# Patient Record
Sex: FEMALE | Race: WHITE | Employment: OTHER | URBAN - METROPOLITAN AREA
[De-identification: names, ages, dates, MRNs, and addresses within clinical notes are randomized per-mention and may not be internally consistent; named-entity substitution may affect disease eponyms.]

---

## 2017-04-24 ENCOUNTER — OFFICE VISIT (OUTPATIENT)
Dept: UROLOGY | Age: 62
End: 2017-04-24
Payer: COMMERCIAL

## 2017-04-24 ENCOUNTER — HOSPITAL ENCOUNTER (OUTPATIENT)
Age: 62
Setting detail: SPECIMEN
Discharge: HOME | End: 2017-04-24
Payer: COMMERCIAL

## 2017-04-24 VITALS
TEMPERATURE: 98.1 F | HEIGHT: 65 IN | WEIGHT: 108 LBS | DIASTOLIC BLOOD PRESSURE: 62 MMHG | SYSTOLIC BLOOD PRESSURE: 102 MMHG | BODY MASS INDEX: 17.99 KG/M2

## 2017-04-24 DIAGNOSIS — R30.0 DYSURIA: ICD-10-CM

## 2017-04-24 DIAGNOSIS — R39.15 URGENCY OF URINATION: Primary | ICD-10-CM

## 2017-04-24 DIAGNOSIS — R10.9 BILATERAL FLANK PAIN: ICD-10-CM

## 2017-04-24 DIAGNOSIS — R35.0 FREQUENCY OF URINATION: ICD-10-CM

## 2017-04-24 DIAGNOSIS — R39.9 LOWER URINARY TRACT SYMPTOMS (LUTS): ICD-10-CM

## 2017-04-24 DIAGNOSIS — R39.15 URGENCY OF URINATION: ICD-10-CM

## 2017-04-24 LAB
-: ABNORMAL
AMORPHOUS: ABNORMAL
BACTERIA: ABNORMAL
BILIRUBIN URINE: NEGATIVE
CASTS UA: ABNORMAL /LPF
COLOR: YELLOW
COMMENT UA: ABNORMAL
CRYSTALS, UA: ABNORMAL /HPF
EPITHELIAL CELLS UA: ABNORMAL /HPF (ref 0–25)
GLUCOSE URINE: NEGATIVE
KETONES, URINE: NEGATIVE
LEUKOCYTE ESTERASE, URINE: NEGATIVE
MUCUS: ABNORMAL
NITRITE, URINE: NEGATIVE
OTHER OBSERVATIONS UA: ABNORMAL
PH UA: 5.5 (ref 5–9)
PROTEIN UA: NEGATIVE
RBC UA: ABNORMAL /HPF (ref 0–2)
RENAL EPITHELIAL, UA: ABNORMAL /HPF
SPECIFIC GRAVITY UA: 1.01 (ref 1.01–1.02)
TRICHOMONAS: ABNORMAL
TURBIDITY: CLEAR
URINE HGB: ABNORMAL
UROBILINOGEN, URINE: NORMAL
WBC UA: ABNORMAL /HPF (ref 0–5)
YEAST: ABNORMAL

## 2017-04-24 PROCEDURE — 81001 URINALYSIS AUTO W/SCOPE: CPT

## 2017-04-24 PROCEDURE — 1036F TOBACCO NON-USER: CPT | Performed by: UROLOGY

## 2017-04-24 PROCEDURE — 99214 OFFICE O/P EST MOD 30 MIN: CPT | Performed by: UROLOGY

## 2017-04-24 PROCEDURE — G8419 CALC BMI OUT NRM PARAM NOF/U: HCPCS | Performed by: UROLOGY

## 2017-04-24 PROCEDURE — 3017F COLORECTAL CA SCREEN DOC REV: CPT | Performed by: UROLOGY

## 2017-04-24 PROCEDURE — 51798 US URINE CAPACITY MEASURE: CPT | Performed by: UROLOGY

## 2017-04-24 PROCEDURE — 87086 URINE CULTURE/COLONY COUNT: CPT

## 2017-04-24 PROCEDURE — 3014F SCREEN MAMMO DOC REV: CPT | Performed by: UROLOGY

## 2017-04-24 PROCEDURE — G8427 DOCREV CUR MEDS BY ELIG CLIN: HCPCS | Performed by: UROLOGY

## 2017-04-24 RX ORDER — ESTRADIOL 10 UG/1
INSERT VAGINAL
Qty: 12 TABLET | Refills: 5 | Status: SHIPPED | OUTPATIENT
Start: 2017-04-24 | End: 2017-05-09 | Stop reason: SDUPTHER

## 2017-04-24 RX ORDER — CIPROFLOXACIN 500 MG/1
500 TABLET, FILM COATED ORAL 2 TIMES DAILY
Qty: 14 TABLET | Refills: 0 | Status: SHIPPED | OUTPATIENT
Start: 2017-04-24 | End: 2017-05-01

## 2017-04-24 ASSESSMENT — ENCOUNTER SYMPTOMS
NAUSEA: 0
VOMITING: 0
ABDOMINAL PAIN: 0
COUGH: 0
BACK PAIN: 0
SHORTNESS OF BREATH: 0
COLOR CHANGE: 0
WHEEZING: 0
EYE REDNESS: 0
EYE PAIN: 0

## 2017-04-25 LAB
CULTURE: NO GROWTH
CULTURE: NORMAL
Lab: NORMAL
Lab: NORMAL
SPECIMEN DESCRIPTION: NORMAL
SPECIMEN DESCRIPTION: NORMAL
STATUS: NORMAL

## 2017-05-08 ENCOUNTER — TELEPHONE (OUTPATIENT)
Dept: UROLOGY | Age: 62
End: 2017-05-08

## 2017-05-09 RX ORDER — ESTRADIOL 10 UG/1
INSERT VAGINAL
Qty: 12 TABLET | Refills: 5 | Status: SHIPPED | OUTPATIENT
Start: 2017-05-09

## 2017-05-24 ENCOUNTER — OFFICE VISIT (OUTPATIENT)
Dept: SURGERY | Age: 62
End: 2017-05-24
Payer: COMMERCIAL

## 2017-05-24 VITALS
TEMPERATURE: 97.3 F | RESPIRATION RATE: 16 BRPM | HEART RATE: 81 BPM | BODY MASS INDEX: 18.26 KG/M2 | DIASTOLIC BLOOD PRESSURE: 62 MMHG | WEIGHT: 109.6 LBS | HEIGHT: 65 IN | SYSTOLIC BLOOD PRESSURE: 96 MMHG

## 2017-05-24 DIAGNOSIS — Z12.11 SCREENING FOR COLON CANCER: Primary | ICD-10-CM

## 2017-05-24 PROCEDURE — 3017F COLORECTAL CA SCREEN DOC REV: CPT | Performed by: SURGERY

## 2017-05-24 PROCEDURE — 1036F TOBACCO NON-USER: CPT | Performed by: SURGERY

## 2017-05-24 PROCEDURE — G8419 CALC BMI OUT NRM PARAM NOF/U: HCPCS | Performed by: SURGERY

## 2017-05-24 PROCEDURE — G8427 DOCREV CUR MEDS BY ELIG CLIN: HCPCS | Performed by: SURGERY

## 2017-05-24 PROCEDURE — 99204 OFFICE O/P NEW MOD 45 MIN: CPT | Performed by: SURGERY

## 2017-05-24 PROCEDURE — 3014F SCREEN MAMMO DOC REV: CPT | Performed by: SURGERY

## 2017-06-22 ENCOUNTER — HOSPITAL ENCOUNTER (OUTPATIENT)
Age: 62
Setting detail: OUTPATIENT SURGERY
Discharge: HOME | End: 2017-06-22
Attending: SURGERY | Admitting: SURGERY
Payer: COMMERCIAL

## 2017-06-22 ENCOUNTER — ANESTHESIA EVENT (OUTPATIENT)
Dept: OPERATING ROOM | Age: 62
End: 2017-06-22
Payer: COMMERCIAL

## 2017-06-22 ENCOUNTER — ANESTHESIA (OUTPATIENT)
Dept: OPERATING ROOM | Age: 62
End: 2017-06-22
Payer: COMMERCIAL

## 2017-06-22 VITALS
WEIGHT: 108 LBS | SYSTOLIC BLOOD PRESSURE: 109 MMHG | HEIGHT: 65 IN | HEART RATE: 72 BPM | RESPIRATION RATE: 18 BRPM | TEMPERATURE: 97.8 F | OXYGEN SATURATION: 99 % | DIASTOLIC BLOOD PRESSURE: 49 MMHG | BODY MASS INDEX: 17.99 KG/M2

## 2017-06-22 VITALS
DIASTOLIC BLOOD PRESSURE: 53 MMHG | OXYGEN SATURATION: 99 % | SYSTOLIC BLOOD PRESSURE: 99 MMHG | RESPIRATION RATE: 29 BRPM

## 2017-06-22 PROCEDURE — 45378 DIAGNOSTIC COLONOSCOPY: CPT | Performed by: SURGERY

## 2017-06-22 PROCEDURE — 2500000003 HC RX 250 WO HCPCS: Performed by: NURSE ANESTHETIST, CERTIFIED REGISTERED

## 2017-06-22 PROCEDURE — 7100000010 HC PHASE II RECOVERY - FIRST 15 MIN: Performed by: SURGERY

## 2017-06-22 PROCEDURE — 3609027000 HC COLONOSCOPY: Performed by: SURGERY

## 2017-06-22 PROCEDURE — 2580000003 HC RX 258: Performed by: NURSE ANESTHETIST, CERTIFIED REGISTERED

## 2017-06-22 PROCEDURE — 3700000001 HC ADD 15 MINUTES (ANESTHESIA): Performed by: SURGERY

## 2017-06-22 PROCEDURE — 3700000000 HC ANESTHESIA ATTENDED CARE: Performed by: SURGERY

## 2017-06-22 PROCEDURE — 6360000002 HC RX W HCPCS: Performed by: NURSE ANESTHETIST, CERTIFIED REGISTERED

## 2017-06-22 PROCEDURE — 7100000011 HC PHASE II RECOVERY - ADDTL 15 MIN: Performed by: SURGERY

## 2017-06-22 RX ORDER — PROPOFOL 10 MG/ML
INJECTION, EMULSION INTRAVENOUS CONTINUOUS PRN
Status: DISCONTINUED | OUTPATIENT
Start: 2017-06-22 | End: 2017-06-22 | Stop reason: SDUPTHER

## 2017-06-22 RX ORDER — SODIUM CHLORIDE, SODIUM LACTATE, POTASSIUM CHLORIDE, CALCIUM CHLORIDE 600; 310; 30; 20 MG/100ML; MG/100ML; MG/100ML; MG/100ML
INJECTION, SOLUTION INTRAVENOUS CONTINUOUS PRN
Status: DISCONTINUED | OUTPATIENT
Start: 2017-06-22 | End: 2017-06-22 | Stop reason: SDUPTHER

## 2017-06-22 RX ORDER — PROPOFOL 10 MG/ML
INJECTION, EMULSION INTRAVENOUS PRN
Status: DISCONTINUED | OUTPATIENT
Start: 2017-06-22 | End: 2017-06-22 | Stop reason: SDUPTHER

## 2017-06-22 RX ORDER — LIDOCAINE HYDROCHLORIDE 20 MG/ML
INJECTION, SOLUTION INFILTRATION; PERINEURAL PRN
Status: DISCONTINUED | OUTPATIENT
Start: 2017-06-22 | End: 2017-06-22 | Stop reason: SDUPTHER

## 2017-06-22 RX ADMIN — SODIUM CHLORIDE, POTASSIUM CHLORIDE, SODIUM LACTATE AND CALCIUM CHLORIDE: 600; 310; 30; 20 INJECTION, SOLUTION INTRAVENOUS at 11:30

## 2017-06-22 RX ADMIN — PROPOFOL 30 MG: 10 INJECTION, EMULSION INTRAVENOUS at 11:45

## 2017-06-22 RX ADMIN — PROPOFOL 120 MCG/KG/MIN: 10 INJECTION, EMULSION INTRAVENOUS at 11:43

## 2017-06-22 RX ADMIN — LIDOCAINE HYDROCHLORIDE 60 MG: 20 INJECTION, SOLUTION INFILTRATION; PERINEURAL at 11:44

## 2017-06-22 RX ADMIN — PROPOFOL 30 MG: 10 INJECTION, EMULSION INTRAVENOUS at 11:40

## 2017-06-22 RX ADMIN — PROPOFOL 50 MG: 10 INJECTION, EMULSION INTRAVENOUS at 11:42

## 2017-06-22 ASSESSMENT — PAIN SCALES - GENERAL
PAINLEVEL_OUTOF10: 0
PAINLEVEL_OUTOF10: 0

## 2017-11-03 ENCOUNTER — HOSPITAL ENCOUNTER (OUTPATIENT)
Dept: PHYSICAL THERAPY | Age: 62
Setting detail: THERAPIES SERIES
Discharge: HOME | End: 2017-11-03
Payer: COMMERCIAL

## 2017-11-03 PROCEDURE — G8982 BODY POS GOAL STATUS: HCPCS

## 2017-11-03 PROCEDURE — 97161 PT EVAL LOW COMPLEX 20 MIN: CPT

## 2017-11-03 PROCEDURE — 97112 NEUROMUSCULAR REEDUCATION: CPT

## 2017-11-03 PROCEDURE — G8981 BODY POS CURRENT STATUS: HCPCS

## 2017-11-03 NOTE — PROGRESS NOTES
Phone: [address] [facility]          Fax: [phone]                      Outpatient Physical Therapy                                                                      Evaluation  Date: 11/3/2017  Patient: Scotty Lin  : 1955  [facility] #: [id number]  Referring Practitioner: Dr. Torres Razo    Referral Date : 17     Diagnosis: BPPV (H81.12)    Treatment Diagnosis: vertigo  Onset Date: 10/30/17  PT Insurance Information: [employer]  Total # of Visits Approved: 6   Total # of Visits to Date: 1  No Show: 0  Canceled Appointment: 0     Subjective  Subjective: Pt arrives to PT evaluation with complaints of vertigo which started on Monday when she woke up. Pt states she sat up and felt like the room was spinning. Pt states she laid back down for a few minutes and then returned to seated position and symptoms were not as bad, but still present for the next 45mins. Pt states same thing has happened every morning since then. Pt states she has been up several times throughout the night with her grandchild which is a . However, pt states when she lays flat and sleeps for more than 2-3 hours, then symptoms present when she gets up. Pt states as the day goes on dizziness gets better. Pt states she saw doctor on Wednesday and was given script for antivert and was instructed to perform Epley maneuver, which pt states she has been unable to perform correctly on her own, even after looking up proper maneuver techniques. Pt states she took antivert last night and states she slept for 12 hours. Objective        Spine  Cervical: WNL          Additional Measures  Special Tests: (-) vertebral artery  Other: (-) Anika-hallpike x2 bilaterally       Assessment  Assessment: Pt is a 57 y/o female who presents to PT with vertigo. Performed [place]-hallpike x2 which was negative bilaterally. However, pt took Antivert within the past 24 hours, which could effect results of testing.  Pt was instructed to schedule reassessment next week and was instructed to avoid taking Antivert for 24hrs prior to appt to get true results with testing. Pt will beneift from skilled PT to address the above impairments and to work toward the established functional goals. Prognosis: Good        Decision Making: Low Complexity    Patient Education  Purpose of samy-hallpike and plan for reassessment next week without Antivert taken within 24hrs. Pt verbalized/demonstrated good understanding:     [x] Yes         [] No, pt required further clarification. [x] Primary Impairment :   G Code:    [] Mobility         [] Carry        [x] Body Position       [] Self Care      [] Other:   Functional Impairment Current:  [] 0%    [] 1-19% [x] 20-39% [] 40-59% [] 60-79%    [] 80-99% [] 100%  Functional Impairment Goal:  [x] 0%    [] 1-19% [] 20-39% [] 40-59% [] 60-79%    [] 80-99% [] 100%  G Code Functional Impairment determined by:  [x] Clinical Judgment   [] Outcome Measure:     Goals  Short term goals  Time Frame for Short term goals: 3 weeks  Short term goal 1: Pt will be reassessed for vertigo with samy-hallpike maneuver and treated with Epley maneuver if needed. Short term goal 2: Pt will report 25% improvement with dizziness for improved quality of life. Long term goals  Time Frame for Long term goals : 6 weeks  Long term goal 1: Pt will demo (-) samy-hallpike to decrease symptoms of dizziness. Long term goal 2: Pt will report 75% improvement in dizziness and overall function for ease of getting in/out of bed.        Patient goals : Decrease dizziness         Minutes Tracking:  Time In: 6945  Time Out: 1600  Minutes: 845 [address], DPD[place]       11/3/2017

## 2017-11-03 NOTE — PLAN OF CARE
[facility]           Phone: [phone]             Outpatient Physical Therapy  Fax: [phone]                                           Date: 11/3/2017  Patient: Mo Gan : 1955 [facility] #: [id number]   Referring Practitioner:  Dr. Amber Stone Referral Date:  17       [x] Plan of Care   [] Updated Plan of Care    Dates of Service to Include: 11/3/2017 to 12/15/17    Diagnosis:  BPPV (H81.12)    Rehab (Treatment) Diagnosis:  vertigo             Onset Date:  10/30/17    Attendance  Total # of Visits to Date: 1 No Show: 0 Canceled Appointment: 0    Assessment  Assessment: Pt is a 59 y/o female who presents to PT with vertigo. Performed [place]-hallpike x2 which was negative bilaterally. However, pt took Antivert within the past 24 hours, which could effect results of testing. Pt was instructed to schedule reassessment next week and was instructed to avoid taking Antivert for 24hrs prior to appt to get true results with testing. Pt will beneift from skilled PT to address the above impairments and to work toward the established functional goals. [x] Primary Impairment :   G Code:    [] Mobility         [] Carry        [x] Body Position       [] Self Care      [] Other:   Functional Impairment Current:  [] 0%    [] 1-19% [x] 20-39% [] 40-59% [] 60-79%    [] 80-99% [] 100%  Functional Impairment Goal:  [x] 0%    [] 1-19% [] 20-39% [] 40-59% [] 60-79%    [] 80-99% [] 100%  G Code Functional Impairment determined by:  [x] Clinical Judgment   [] Outcome Measure:     Goals  Short term goals  Time Frame for Short term goals: 3 weeks  Short term goal 1: Pt will be reassessed for vertigo with samy-hallpike maneuver and treated with Epley maneuver if needed. Short term goal 2: Pt will report 25% improvement with dizziness for improved quality of life.   Long term goals  Time Frame for Long term goals : 6 weeks  Long term

## 2018-08-10 ENCOUNTER — OFFICE VISIT (OUTPATIENT)
Dept: UROLOGY | Age: 63
End: 2018-08-10
Payer: COMMERCIAL

## 2018-08-10 VITALS
HEIGHT: 65 IN | SYSTOLIC BLOOD PRESSURE: 118 MMHG | WEIGHT: 109 LBS | BODY MASS INDEX: 18.16 KG/M2 | DIASTOLIC BLOOD PRESSURE: 80 MMHG

## 2018-08-10 DIAGNOSIS — N32.81 OAB (OVERACTIVE BLADDER): ICD-10-CM

## 2018-08-10 DIAGNOSIS — R31.29 MICROSCOPIC HEMATURIA: Primary | ICD-10-CM

## 2018-08-10 PROCEDURE — 3017F COLORECTAL CA SCREEN DOC REV: CPT | Performed by: NURSE PRACTITIONER

## 2018-08-10 PROCEDURE — G8427 DOCREV CUR MEDS BY ELIG CLIN: HCPCS | Performed by: NURSE PRACTITIONER

## 2018-08-10 PROCEDURE — G8419 CALC BMI OUT NRM PARAM NOF/U: HCPCS | Performed by: NURSE PRACTITIONER

## 2018-08-10 PROCEDURE — 99213 OFFICE O/P EST LOW 20 MIN: CPT | Performed by: NURSE PRACTITIONER

## 2018-08-10 PROCEDURE — 1036F TOBACCO NON-USER: CPT | Performed by: NURSE PRACTITIONER

## 2018-08-10 NOTE — PROGRESS NOTES
Subjective:      Patient ID: Dannielle Da Silva is a 61 y.o. female. HPI  Patient is a 61 y.o. female in no acute distress. She is alert and oriented to person, place, and time. History  Referred for LUTS and bilateral flank pain. Treated with several courses of abx (not always positive culture) and Ditropan XL with improvement. 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 Microscopic hematuria: She did see Dr. Jignesh Muniz in the past, normal cystoscopy. 7/2016 normal. CT and Cysto normal. Pelvic showed vaginal atrophy, no cystocele, no rectocele, no levator ani tenderness, no reproducible stress urinary incontinence    She saw Dr. Gonzalez Haas once in the past and was diagnosed with IC (at first appt, no cysto) but stopped the medication and did not continue follow-up with him. Today  Here today for annual follow-up for history of microscopic hematuria. She did have a urinalysis completed and 6/2018 that was negative for infection, but did show persistent significant microscopic hematuria. Last hematuria workup was in 7/2016. She has had no episodes of gross hematuria or flank pain. She does continue to take oxybutynin 2.5mg. she denies new or worsening frequency, urgency, nocturia, or incontinence.     Past Medical History:   Diagnosis Date    Low back pain     Other intervertebral disc degeneration, lumbar region     Pure hypercholesterolemia     Unspecified vitamin D deficiency     Urgency of urination      Past Surgical History:   Procedure Laterality Date    KNEE SURGERY Left 1972    cartliage removal     [place] COLON CA SCRN NOT HI [facility] IND N/A 6/22/2017    COLONOSCOPY performed by Tessy Aparicio DO at \[facility]"" [facility]       Family History   Problem Relation Age of Onset    Cancer Father      Current Outpatient Prescriptions on File Prior to Visit   Medication Sig Dispense Refill    Estradiol (VAGIFEM) 10 MCG TABS vaginal tablet Use vaginally three times per week (Patient taking differently: as needed Use vaginally three times per week) 12 tablet 5    oxybutynin (DITROPAN-XL) 5 MG CR tablet Take 2.5 mg by mouth daily Prn      Zinc 15 MG CAPS Take 15 mg by mouth daily      Cholecalciferol (VITAMIN D) 2000 UNITS CAPS capsule Take 2,000 Units by mouth 2 times daily      magnesium oxide (MAG-OX) 400 MG tablet Take 400 mg by mouth daily       No current facility-administered medications on file prior to visit. Outpatient Encounter Prescriptions as of 8/10/2018   Medication Sig Dispense Refill    Estradiol (VAGIFEM) 10 MCG TABS vaginal tablet Use vaginally three times per week (Patient taking differently: as needed Use vaginally three times per week) 12 tablet 5    oxybutynin (DITROPAN-XL) 5 MG CR tablet Take 2.5 mg by mouth daily Prn      Zinc 15 MG CAPS Take 15 mg by mouth daily      Cholecalciferol (VITAMIN D) 2000 UNITS CAPS capsule Take 2,000 Units by mouth 2 times daily      magnesium oxide (MAG-OX) 400 MG tablet Take 400 mg by mouth daily       No facility-administered encounter medications on file as of 8/10/2018. Actonel [risedronate sodium] and Septra [sulfamethoxazole-trimethoprim]  History   Smoking Status    Never Smoker   Smokeless Tobacco    Never Used     History   Alcohol Use No     Comment: occasional wine       Vitals:    08/10/18 1102   BP: 118/80     PHYSICAL EXAM:  Constitutional: Patient resting comfortably, in no acute distress. Neuro: Alert and oriented to person place and time. Cranial nerves grossly intact. Psych: Mood and affect normal.  Skin: Warm, dry  HEENT: normocephalic, atraumatic  Lymphatics: No palpable lymphadenopathy  Lungs: Respiratory effort normal, unlabored  Cardiovascular:  Normal peripheral pulses  Abdomen: Soft, non-tender, non-distended with no organomegaly or palpable masses. : No CVA tenderness. Bladder non-tender and not distended. Pelvic: Deferred    No results found for this visit on 08/10/18.   No results found for:

## 2018-08-14 PROBLEM — R31.29 MICROSCOPIC HEMATURIA: Status: ACTIVE | Noted: 2018-08-14

## 2018-08-14 PROBLEM — N32.81 OAB (OVERACTIVE BLADDER): Status: ACTIVE | Noted: 2018-08-14

## 2018-08-14 ASSESSMENT — ENCOUNTER SYMPTOMS
COLOR CHANGE: 0
WHEEZING: 0
VOMITING: 0
SHORTNESS OF BREATH: 0
COUGH: 0
BACK PAIN: 0
CONSTIPATION: 0
NAUSEA: 0
ABDOMINAL PAIN: 0
EYE REDNESS: 0
EYE PAIN: 0

## 2018-09-17 ENCOUNTER — TELEPHONE (OUTPATIENT)
Dept: UROLOGY | Age: 63
End: 2018-09-17

## 2018-09-17 DIAGNOSIS — R30.0 DYSURIA: Primary | ICD-10-CM

## 2018-09-17 DIAGNOSIS — R39.15 URGENCY OF URINATION: ICD-10-CM

## 2018-09-18 RX ORDER — CIPROFLOXACIN 500 MG/1
500 TABLET, FILM COATED ORAL 2 TIMES DAILY
Qty: 20 TABLET | Refills: 0 | Status: SHIPPED | OUTPATIENT
Start: 2018-09-18 | End: 2018-09-28

## 2018-09-26 ENCOUNTER — TELEPHONE (OUTPATIENT)
Dept: UROLOGY | Age: 63
End: 2018-09-26

## 2018-09-27 NOTE — TELEPHONE ENCOUNTER
Spoke with patient and was advised she took the cipro for 5 days and stopped due it causing stomach GI issues. She says she did not drop urine sample off as instructed. She says she did get some relief from the cipro, enough to be able to work. Patient advised to have UAC/S checked as instructed to evaluate for infection. Patient voiced understanding.

## 2018-10-10 ENCOUNTER — TELEPHONE (OUTPATIENT)
Dept: UROLOGY | Age: 63
End: 2018-10-10

## 2018-10-10 DIAGNOSIS — R30.0 DYSURIA: ICD-10-CM

## 2018-10-10 DIAGNOSIS — R39.15 URGENCY OF URINATION: ICD-10-CM

## 2018-10-10 NOTE — PROGRESS NOTES
Call pt - urine cx reviewed and negative for UTI & for significant microhematuria.  Next time wee need to check her urine before antibiotics

## 2018-10-10 NOTE — TELEPHONE ENCOUNTER
----- Message from DINO Lugo - CNP sent at 10/10/2018  1:37 PM EDT -----  Call pt - urine cx reviewed and negative for UTI & for significant microhematuria.  Next time wee need to check her urine before antibiotics

## 2019-09-09 ENCOUNTER — HOSPITAL ENCOUNTER (OUTPATIENT)
Age: 64
Setting detail: SPECIMEN
Discharge: HOME | End: 2019-09-09
Payer: COMMERCIAL

## 2019-09-09 ENCOUNTER — OFFICE VISIT (OUTPATIENT)
Dept: UROLOGY | Age: 64
End: 2019-09-09
Payer: COMMERCIAL

## 2019-09-09 VITALS
BODY MASS INDEX: 17.83 KG/M2 | WEIGHT: 107 LBS | HEIGHT: 65 IN | DIASTOLIC BLOOD PRESSURE: 70 MMHG | SYSTOLIC BLOOD PRESSURE: 110 MMHG

## 2019-09-09 DIAGNOSIS — R30.0 DYSURIA: Primary | ICD-10-CM

## 2019-09-09 DIAGNOSIS — R31.29 MICROSCOPIC HEMATURIA: ICD-10-CM

## 2019-09-09 DIAGNOSIS — R39.15 URGENCY OF URINATION: ICD-10-CM

## 2019-09-09 DIAGNOSIS — N32.81 OAB (OVERACTIVE BLADDER): ICD-10-CM

## 2019-09-09 DIAGNOSIS — R30.0 DYSURIA: ICD-10-CM

## 2019-09-09 LAB
-: ABNORMAL
AMORPHOUS: ABNORMAL
BACTERIA: ABNORMAL
BILIRUBIN URINE: NEGATIVE
CASTS UA: ABNORMAL /LPF
COLOR: YELLOW
COMMENT UA: ABNORMAL
CRYSTALS, UA: ABNORMAL /HPF
EPITHELIAL CELLS UA: ABNORMAL /HPF (ref 0–25)
GLUCOSE URINE: NEGATIVE
KETONES, URINE: NEGATIVE
LEUKOCYTE ESTERASE, URINE: NEGATIVE
MUCUS: ABNORMAL
NITRITE, URINE: NEGATIVE
OTHER OBSERVATIONS UA: ABNORMAL
PH UA: 5.5 (ref 5–9)
PROTEIN UA: NEGATIVE
RBC UA: ABNORMAL /HPF (ref 0–2)
RENAL EPITHELIAL, UA: ABNORMAL /HPF
SPECIFIC GRAVITY UA: >1.03 (ref 1.01–1.02)
TRICHOMONAS: ABNORMAL
TURBIDITY: CLEAR
URINE HGB: ABNORMAL
UROBILINOGEN, URINE: NORMAL
WBC UA: ABNORMAL /HPF (ref 0–5)
YEAST: ABNORMAL

## 2019-09-09 PROCEDURE — G8419 CALC BMI OUT NRM PARAM NOF/U: HCPCS | Performed by: UROLOGY

## 2019-09-09 PROCEDURE — 87086 URINE CULTURE/COLONY COUNT: CPT

## 2019-09-09 PROCEDURE — 1036F TOBACCO NON-USER: CPT | Performed by: UROLOGY

## 2019-09-09 PROCEDURE — 81001 URINALYSIS AUTO W/SCOPE: CPT

## 2019-09-09 PROCEDURE — 51798 US URINE CAPACITY MEASURE: CPT | Performed by: UROLOGY

## 2019-09-09 PROCEDURE — 3017F COLORECTAL CA SCREEN DOC REV: CPT | Performed by: UROLOGY

## 2019-09-09 PROCEDURE — 99213 OFFICE O/P EST LOW 20 MIN: CPT | Performed by: UROLOGY

## 2019-09-09 PROCEDURE — G8427 DOCREV CUR MEDS BY ELIG CLIN: HCPCS | Performed by: UROLOGY

## 2019-09-09 ASSESSMENT — ENCOUNTER SYMPTOMS
ABDOMINAL PAIN: 0
EYE REDNESS: 0
COUGH: 0
COLOR CHANGE: 0
WHEEZING: 0
NAUSEA: 0
VOMITING: 0
SHORTNESS OF BREATH: 0
EYE PAIN: 0
BACK PAIN: 0

## 2019-09-09 NOTE — PATIENT INSTRUCTIONS
SURVEY:    You may be receiving a survey from [employer] regarding your visit today. Please complete the survey to enable us to provide the highest quality of care to you and your family. If you cannot score us a very good on any question, please call the office to discuss how we could have made your experience a very good one. Thank you.

## 2019-09-10 LAB
CULTURE: NORMAL
Lab: NORMAL
SPECIMEN DESCRIPTION: NORMAL

## 2019-09-11 ENCOUNTER — TELEPHONE (OUTPATIENT)
Dept: UROLOGY | Age: 64
End: 2019-09-11

## 2019-09-11 NOTE — TELEPHONE ENCOUNTER
----- Message from DINO Macias - CNP sent at 9/11/2019  4:15 PM EDT -----  Call pt - urine cx reviewed and negative for UTI & for significant microhematuria

## 2020-07-28 ENCOUNTER — HOSPITAL ENCOUNTER (OUTPATIENT)
Age: 65
Discharge: HOME | End: 2020-07-28
Payer: MEDICARE

## 2020-07-28 LAB
ABSOLUTE EOS #: 0.4 K/UL (ref 0–0.44)
ABSOLUTE IMMATURE GRANULOCYTE: <0.03 K/UL (ref 0–0.3)
ABSOLUTE LYMPH #: 1.54 K/UL (ref 1.1–3.7)
ABSOLUTE MONO #: 0.47 K/UL (ref 0.1–1.2)
ALBUMIN SERPL-MCNC: 4.3 G/DL (ref 3.5–5.2)
ALBUMIN/GLOBULIN RATIO: 1.7 (ref 1–2.5)
ALP BLD-CCNC: 57 U/L (ref 35–104)
ALT SERPL-CCNC: 15 U/L (ref 5–33)
ANION GAP SERPL CALCULATED.3IONS-SCNC: 11 MMOL/L (ref 9–17)
AST SERPL-CCNC: 15 U/L
BASOPHILS # BLD: 1 % (ref 0–2)
BASOPHILS ABSOLUTE: 0.04 K/UL (ref 0–0.2)
BILIRUB SERPL-MCNC: 0.56 MG/DL (ref 0.3–1.2)
BUN BLDV-MCNC: 12 MG/DL (ref 8–23)
BUN/CREAT BLD: 19 (ref 9–20)
CALCIUM SERPL-MCNC: 9.7 MG/DL (ref 8.6–10.4)
CHLORIDE BLD-SCNC: 102 MMOL/L (ref 98–107)
CHOLESTEROL/HDL RATIO: 3.1
CHOLESTEROL: 226 MG/DL
CO2: 25 MMOL/L (ref 20–31)
CREAT SERPL-MCNC: 0.63 MG/DL (ref 0.5–0.9)
DIFFERENTIAL TYPE: ABNORMAL
EOSINOPHILS RELATIVE PERCENT: 8 % (ref 1–4)
GFR AFRICAN AMERICAN: >60 ML/MIN
GFR NON-AFRICAN AMERICAN: >60 ML/MIN
GFR SERPL CREATININE-BSD FRML MDRD: NORMAL ML/MIN/{1.73_M2}
GFR SERPL CREATININE-BSD FRML MDRD: NORMAL ML/MIN/{1.73_M2}
GLUCOSE BLD-MCNC: 97 MG/DL (ref 70–99)
HCT VFR BLD CALC: 45 % (ref 36.3–47.1)
HDLC SERPL-MCNC: 72 MG/DL
HEMOGLOBIN: 14.5 G/DL (ref 11.9–15.1)
IMMATURE GRANULOCYTES: 0 %
LDL CHOLESTEROL: 139 MG/DL (ref 0–130)
LYMPHOCYTES # BLD: 31 % (ref 24–43)
MCH RBC QN AUTO: 30.3 PG (ref 25.2–33.5)
MCHC RBC AUTO-ENTMCNC: 32.2 G/DL (ref 28.4–34.8)
MCV RBC AUTO: 93.9 FL (ref 82.6–102.9)
MONOCYTES # BLD: 9 % (ref 3–12)
NRBC AUTOMATED: 0 PER 100 WBC
PDW BLD-RTO: 12.4 % (ref 11.8–14.4)
PLATELET # BLD: 234 K/UL (ref 138–453)
PLATELET ESTIMATE: ABNORMAL
PMV BLD AUTO: 9.7 FL (ref 8.1–13.5)
POTASSIUM SERPL-SCNC: 4.1 MMOL/L (ref 3.7–5.3)
RBC # BLD: 4.79 M/UL (ref 3.95–5.11)
RBC # BLD: ABNORMAL 10*6/UL
SEG NEUTROPHILS: 51 % (ref 36–65)
SEGMENTED NEUTROPHILS ABSOLUTE COUNT: 2.56 K/UL (ref 1.5–8.1)
SODIUM BLD-SCNC: 138 MMOL/L (ref 135–144)
TOTAL PROTEIN: 6.8 G/DL (ref 6.4–8.3)
TRIGL SERPL-MCNC: 75 MG/DL
VITAMIN D 25-HYDROXY: 64.5 NG/ML (ref 30–100)
VLDLC SERPL CALC-MCNC: ABNORMAL MG/DL (ref 1–30)
WBC # BLD: 5 K/UL (ref 3.5–11.3)
WBC # BLD: ABNORMAL 10*3/UL

## 2020-07-28 PROCEDURE — 80061 LIPID PANEL: CPT

## 2020-07-28 PROCEDURE — 80053 COMPREHEN METABOLIC PANEL: CPT

## 2020-07-28 PROCEDURE — 85025 COMPLETE CBC W/AUTO DIFF WBC: CPT

## 2020-07-28 PROCEDURE — 36415 COLL VENOUS BLD VENIPUNCTURE: CPT

## 2020-07-28 PROCEDURE — 82306 VITAMIN D 25 HYDROXY: CPT

## 2021-05-28 ENCOUNTER — HOSPITAL ENCOUNTER (OUTPATIENT)
Dept: WOMENS IMAGING | Age: 66
Discharge: HOME | End: 2021-05-30
Payer: MEDICARE

## 2021-05-28 DIAGNOSIS — Z12.31 ENCOUNTER FOR SCREENING MAMMOGRAM FOR MALIGNANT NEOPLASM OF BREAST: ICD-10-CM

## 2021-05-28 DIAGNOSIS — M85.89 OTHER SPECIFIED DISORDERS OF BONE DENSITY AND STRUCTURE, MULTIPLE SITES: ICD-10-CM

## 2021-05-28 PROCEDURE — 77063 BREAST TOMOSYNTHESIS BI: CPT

## 2021-05-28 PROCEDURE — 77080 DXA BONE DENSITY AXIAL: CPT

## 2021-06-21 ENCOUNTER — HOSPITAL ENCOUNTER (OUTPATIENT)
Dept: GENERAL RADIOLOGY | Age: 66
Discharge: HOME | End: 2021-06-23
Payer: MEDICARE

## 2021-06-21 ENCOUNTER — HOSPITAL ENCOUNTER (OUTPATIENT)
Age: 66
Discharge: HOME | End: 2021-06-23
Payer: MEDICARE

## 2021-06-21 ENCOUNTER — HOSPITAL ENCOUNTER (OUTPATIENT)
Dept: PULMONOLOGY | Age: 66
Discharge: HOME | End: 2021-06-21
Payer: MEDICARE

## 2021-06-21 DIAGNOSIS — J45.909 ASTHMA, UNSPECIFIED ASTHMA SEVERITY, UNSPECIFIED WHETHER COMPLICATED, UNSPECIFIED WHETHER PERSISTENT: ICD-10-CM

## 2021-06-21 DIAGNOSIS — R05.9 COUGH: ICD-10-CM

## 2021-06-21 PROCEDURE — 71046 X-RAY EXAM CHEST 2 VIEWS: CPT

## 2021-06-21 PROCEDURE — 94664 DEMO&/EVAL PT USE INHALER: CPT

## 2021-06-21 PROCEDURE — 94729 DIFFUSING CAPACITY: CPT

## 2021-06-21 PROCEDURE — 94060 EVALUATION OF WHEEZING: CPT

## 2021-06-21 PROCEDURE — 94726 PLETHYSMOGRAPHY LUNG VOLUMES: CPT

## 2021-06-21 PROCEDURE — 6370000000 HC RX 637 (ALT 250 FOR IP): Performed by: INTERNAL MEDICINE

## 2021-06-21 RX ORDER — ALBUTEROL SULFATE 90 UG/1
4 AEROSOL, METERED RESPIRATORY (INHALATION) ONCE
Status: COMPLETED | OUTPATIENT
Start: 2021-06-21 | End: 2021-06-21

## 2021-06-21 RX ADMIN — ALBUTEROL SULFATE 4 PUFF: 90 AEROSOL, METERED RESPIRATORY (INHALATION) at 16:30

## 2021-07-07 NOTE — PROCEDURES
PULMONARY FUNCTION TEST      DATE 2021    COMPONENTS  Following components of the pulmonary function tests were performed during this study:    [x] Spirometry with the Forced Vital Capacity maneuver   [x] With pre-and post- bronchodilator challenge  [] Without pre-and post- bronchodilator challenge  [x] Flow-volume loop  [x] Lung volumes (Body plethysmography, when applicable)  [x] Airway resistance  [x] Diffusion capacity  [] Resting oxygen saturation  [] Maximum inspiratory and expiratory pressures    QUALITY  Based on technician observations and review of the raw data, shows that the study is of  [] Good quality and seem to reflect true performance capacity as  [] Patient demonstrated good effort and cooperation  [] Test met the ATS standard for acceptability and repeatability    [x] Marginal or poor quality and may not reflect true performance capacity as  [] ATS standard of \"END OF TEST\" was not met  [] Patient demonstrated hesitancy, poor effort and cooperation  [x] Patient had coughing during the first second of FVC maneuver  [] Unable to perform body plethysmography  [] Unable to perform DLCO maneuver    MEASUREMENTS  FVC: 2.57 L (79% predicted), changed to 2.46 L (75% predicted), -4% change after bronchodilator  FEV1: 1.91 L (76% predicted), changed to 2.03 L (81% predicted), +6% change after bronchodilator  FEV1/FVC: 83% post bronchodilator  T.59 L (88% predicted)  RV: 2.02 L (93% predicted)  RV/T%  DLCO: 25.6 mL/min/mmHg (95% predicted)    SPIROMETRY       [x]   NORMAL     []   OBSTRUCTIVE VENTILATORY IMPAIRMENT     []   SUGGESTS RESTRICTIVE IMPAIRMENT     []   SUGGESTS SMALL AIRWAY DISEASE     BRONCHODILATOR RESPONSE    [x]   NONE     []   BORDERLINE     []   SIGNIFICANT     FLOW-VOLUME LOOP PATTERN    [x]     ABNORMAL-cough artifact seen during for second     []   OBSTRUCTIVE      []   RESTRICTIVE      []   VARIABLE - EXTRATHORACIC     []   VARIABLE - INTRATHORACIC     []   FIXED OBSTRUCTIVE      []   SAWTOOTH      LUNG VOLUMES/BODY PLETHYSMOGRAPHY     TOTAL LUNG CAPACITY  [x] NORMAL    [] AT LOWER LIMIT OF NORMAL    [] REDUCED (CONFIRMS RESTRICTION) < 80 % predicted   [] INCREASED (SUGGESTIVE OF HYPERINFLATION) > 120 % predicted   [] UNABLE TO PERFORM      RESIDUAL VOLUME or RV/TLC  [x] NORMAL    [] AT LOWER LIMIT OF NORMAL    [] REDUCED (CONFIRMS RESTRICTION) < 80%   [] INCREASED (SUGGESTIVE OF AIR TRAPPING) > 120 %     EXPIRATORY RESERVE VOLUME  [] NORMAL   [x] REDUCED (CONSISTENT WITH BODY HABITUS/OBESITY)     AIRWAY RESISTANCE  [x] NORMAL   [] MILDLY INCREASED   [] MODERATELY INCREASED   [] SEVERELY INCREASED     DIFFUSION CAPACITY       DLCO (% predicted)   [x] NORMAL    [] NORMAL (WHEN CORRECTED FOR ALVEOLAR VOLUME)    [] MILD REDUCTION > 60 % and < 75 %   [] MODERATE REDUCTION 40 to 60 %   [] SEVERE REDUCTION < 40 %   [] INCREASED > 125 %     FINAL IMPRESSION     The overall study shows normal spirometry, lung volumes and diffusion capacity. There is no bronchodilator response. COMMENTS  Please correlate clinically.

## 2023-04-25 ENCOUNTER — TELEPHONE (OUTPATIENT)
Dept: UROLOGY | Age: 68
End: 2023-04-25

## 2023-04-25 NOTE — TELEPHONE ENCOUNTER
Patient called complaining of UTI symptoms. I offered her 2:00pm this afternoon. She is going out of town so she will call family doctor or go to urgent care.

## 2023-04-25 NOTE — TELEPHONE ENCOUNTER
Patient chart was reviewed. Patient has not been seen in over 3 years. As documented by  we did offer her an appointment this afternoon but she was unable to come.   Patient is going to see her primary care

## 2023-06-05 ENCOUNTER — OFFICE VISIT (OUTPATIENT)
Dept: UROLOGY | Age: 68
End: 2023-06-05
Payer: MEDICARE

## 2023-06-05 ENCOUNTER — HOSPITAL ENCOUNTER (OUTPATIENT)
Age: 68
Setting detail: SPECIMEN
Discharge: HOME | End: 2023-06-05
Payer: MEDICARE

## 2023-06-05 VITALS
DIASTOLIC BLOOD PRESSURE: 75 MMHG | SYSTOLIC BLOOD PRESSURE: 115 MMHG | TEMPERATURE: 97.5 F | BODY MASS INDEX: 18.83 KG/M2 | HEART RATE: 83 BPM | WEIGHT: 113 LBS | HEIGHT: 65 IN

## 2023-06-05 DIAGNOSIS — R30.0 DYSURIA: ICD-10-CM

## 2023-06-05 DIAGNOSIS — N39.0 FREQUENT UTI: ICD-10-CM

## 2023-06-05 DIAGNOSIS — R30.0 DYSURIA: Primary | ICD-10-CM

## 2023-06-05 LAB
AMORPH SED URNS QL MICRO: ABNORMAL
BACTERIA URNS QL MICRO: ABNORMAL
BILIRUB UR QL STRIP: NEGATIVE
CLARITY UR: ABNORMAL
COLOR UR: YELLOW
CRYSTALS URNS MICRO: ABNORMAL /HPF
EPI CELLS #/AREA URNS HPF: ABNORMAL /HPF (ref 0–25)
GLUCOSE UR STRIP-MCNC: NEGATIVE MG/DL
HGB UR QL STRIP.AUTO: ABNORMAL
KETONES UR STRIP-MCNC: NEGATIVE MG/DL
LEUKOCYTE ESTERASE UR QL STRIP: NEGATIVE
MUCOUS THREADS URNS QL MICRO: ABNORMAL
NITRITE UR QL STRIP: NEGATIVE
PH UR STRIP: 6 [PH] (ref 5–9)
PROT UR STRIP-MCNC: NEGATIVE MG/DL
RBC #/AREA URNS HPF: ABNORMAL /HPF (ref 0–2)
SP GR UR STRIP: 1.02 (ref 1.01–1.02)
UROBILINOGEN UR STRIP-ACNC: NORMAL
WBC #/AREA URNS HPF: ABNORMAL /HPF (ref 0–5)

## 2023-06-05 PROCEDURE — 81001 URINALYSIS AUTO W/SCOPE: CPT

## 2023-06-05 PROCEDURE — G8427 DOCREV CUR MEDS BY ELIG CLIN: HCPCS | Performed by: UROLOGY

## 2023-06-05 PROCEDURE — 99204 OFFICE O/P NEW MOD 45 MIN: CPT | Performed by: UROLOGY

## 2023-06-05 PROCEDURE — 1090F PRES/ABSN URINE INCON ASSESS: CPT | Performed by: UROLOGY

## 2023-06-05 PROCEDURE — 4004F PT TOBACCO SCREEN RCVD TLK: CPT | Performed by: UROLOGY

## 2023-06-05 PROCEDURE — 87086 URINE CULTURE/COLONY COUNT: CPT

## 2023-06-05 PROCEDURE — 3017F COLORECTAL CA SCREEN DOC REV: CPT | Performed by: UROLOGY

## 2023-06-05 PROCEDURE — 1123F ACP DISCUSS/DSCN MKR DOCD: CPT | Performed by: UROLOGY

## 2023-06-05 PROCEDURE — PBSHW PR MEAS POST-VOIDING RESIDUAL URINE&/BLADDER CAP: Performed by: UROLOGY

## 2023-06-05 PROCEDURE — G8420 CALC BMI NORM PARAMETERS: HCPCS | Performed by: UROLOGY

## 2023-06-05 PROCEDURE — G8399 PT W/DXA RESULTS DOCUMENT: HCPCS | Performed by: UROLOGY

## 2023-06-05 PROCEDURE — 51798 US URINE CAPACITY MEASURE: CPT | Performed by: UROLOGY

## 2023-06-05 RX ORDER — ESTRADIOL 0.1 MG/G
1 CREAM VAGINAL DAILY
Qty: 1 EACH | Refills: 3 | Status: SHIPPED | OUTPATIENT
Start: 2023-06-05

## 2023-06-05 RX ORDER — CIPROFLOXACIN 500 MG/1
500 TABLET, FILM COATED ORAL 2 TIMES DAILY
Qty: 20 TABLET | Refills: 0 | Status: SHIPPED | OUTPATIENT
Start: 2023-06-05 | End: 2023-06-15

## 2023-06-05 RX ORDER — MONTELUKAST SODIUM 10 MG/1
10 TABLET ORAL DAILY
COMMUNITY

## 2023-06-05 RX ORDER — ROSUVASTATIN CALCIUM 10 MG/1
TABLET, COATED ORAL
COMMUNITY
Start: 2023-05-18

## 2023-06-05 NOTE — PROGRESS NOTES
Bladderscan performed in office today:  Pt voided - 20 mL, PVR - 15 mL
deferred    Lab Results   Component Value Date    BUN 12 07/28/2020     Lab Results   Component Value Date    CREATININE 0.63 07/28/2020       ASSESSMENT:  This is a 76 y.o. female with the following diagnoses:   Diagnosis Orders   1. Dysuria  Urinalysis with Microscopic    Culture, Urine    NE DWAINE POST-VOIDING RESIDUAL URINE&/BLADDER CAP    estradiol (ESTRACE VAGINAL) 0.1 MG/GM vaginal cream    ciprofloxacin (CIPRO) 500 MG tablet      2. Frequent UTI  NE DWAINE POST-VOIDING RESIDUAL URINE&/BLADDER CAP    estradiol (ESTRACE VAGINAL) 0.1 MG/GM vaginal cream    ciprofloxacin (CIPRO) 500 MG tablet             PLAN:  Did start her back on vaginal estrogen today. We also gave her 10-day course of empiric ciprofloxacin. We will check urine for culture and sensitivity. We will see her back in 3 to 4 weeks.

## 2023-06-05 NOTE — PATIENT INSTRUCTIONS
SURVEY:    You may be receiving a survey from [employer] regarding your visit today. Please complete the survey to enable us to provide the highest quality of care to you and your family. If you cannot score us a very good on any question, please call the office to discuss how we could have made your experience a very good one. Thank you.

## 2023-06-06 LAB
MICROORGANISM SPEC CULT: NO GROWTH
SPECIMEN DESCRIPTION: NORMAL

## 2023-06-07 ENCOUNTER — HOSPITAL ENCOUNTER (OUTPATIENT)
Dept: WOMENS IMAGING | Age: 68
Discharge: HOME | End: 2023-06-09
Payer: MEDICARE

## 2023-06-07 ENCOUNTER — TELEPHONE (OUTPATIENT)
Dept: UROLOGY | Age: 68
End: 2023-06-07

## 2023-06-07 DIAGNOSIS — M85.89 OTHER SPECIFIED DISORDERS OF BONE DENSITY AND STRUCTURE, MULTIPLE SITES: ICD-10-CM

## 2023-06-07 DIAGNOSIS — Z12.31 ENCOUNTER FOR SCREENING MAMMOGRAM FOR MALIGNANT NEOPLASM OF BREAST: ICD-10-CM

## 2023-06-07 PROCEDURE — 77080 DXA BONE DENSITY AXIAL: CPT

## 2023-06-07 PROCEDURE — 77063 BREAST TOMOSYNTHESIS BI: CPT

## 2023-06-07 NOTE — TELEPHONE ENCOUNTER
----- [facility] at 6/7/2023  8:07 AM EDT -----  Please call pt - urine culture reviewed and does not show UTI

## 2023-07-06 ENCOUNTER — TELEPHONE (OUTPATIENT)
Dept: UROLOGY | Age: 68
End: 2023-07-06

## 2023-07-06 ENCOUNTER — OFFICE VISIT (OUTPATIENT)
Dept: UROLOGY | Age: 68
End: 2023-07-06
Payer: MEDICARE

## 2023-07-06 VITALS
HEIGHT: 65 IN | SYSTOLIC BLOOD PRESSURE: 102 MMHG | DIASTOLIC BLOOD PRESSURE: 64 MMHG | TEMPERATURE: 97.4 F | WEIGHT: 117 LBS | BODY MASS INDEX: 19.49 KG/M2

## 2023-07-06 DIAGNOSIS — N95.2 VAGINAL ATROPHY: Primary | ICD-10-CM

## 2023-07-06 DIAGNOSIS — N39.0 FREQUENT UTI: ICD-10-CM

## 2023-07-06 DIAGNOSIS — R30.0 DYSURIA: ICD-10-CM

## 2023-07-06 PROCEDURE — 3017F COLORECTAL CA SCREEN DOC REV: CPT | Performed by: NURSE PRACTITIONER

## 2023-07-06 PROCEDURE — G8427 DOCREV CUR MEDS BY ELIG CLIN: HCPCS | Performed by: NURSE PRACTITIONER

## 2023-07-06 PROCEDURE — 1123F ACP DISCUSS/DSCN MKR DOCD: CPT | Performed by: NURSE PRACTITIONER

## 2023-07-06 PROCEDURE — G8420 CALC BMI NORM PARAMETERS: HCPCS | Performed by: NURSE PRACTITIONER

## 2023-07-06 PROCEDURE — G8399 PT W/DXA RESULTS DOCUMENT: HCPCS | Performed by: NURSE PRACTITIONER

## 2023-07-06 PROCEDURE — PBSHW PR MEAS POST-VOIDING RESIDUAL URINE&/BLADDER CAP: Performed by: NURSE PRACTITIONER

## 2023-07-06 PROCEDURE — 99213 OFFICE O/P EST LOW 20 MIN: CPT | Performed by: NURSE PRACTITIONER

## 2023-07-06 PROCEDURE — 1036F TOBACCO NON-USER: CPT | Performed by: NURSE PRACTITIONER

## 2023-07-06 PROCEDURE — 1090F PRES/ABSN URINE INCON ASSESS: CPT | Performed by: NURSE PRACTITIONER

## 2023-07-06 PROCEDURE — 51798 US URINE CAPACITY MEASURE: CPT | Performed by: NURSE PRACTITIONER

## 2023-07-06 RX ORDER — OXYBUTYNIN CHLORIDE 5 MG/1
5 TABLET, EXTENDED RELEASE ORAL DAILY
Qty: 30 TABLET | Refills: 0
Start: 2023-07-06

## 2023-07-06 RX ORDER — ESTRADIOL 0.1 MG/G
1 CREAM VAGINAL DAILY
Qty: 1 EACH | Refills: 3 | Status: SHIPPED | OUTPATIENT
Start: 2023-07-06

## 2023-07-06 ASSESSMENT — ENCOUNTER SYMPTOMS
ABDOMINAL PAIN: 0
CONSTIPATION: 0
VOMITING: 0
WHEEZING: 0
COLOR CHANGE: 0
SHORTNESS OF BREATH: 0
APNEA: 0
NAUSEA: 0
EYE REDNESS: 0
BACK PAIN: 0
COUGH: 0

## 2023-07-06 NOTE — PATIENT INSTRUCTIONS
Estrogen cream: Place a pea-sized amount vaginally 3 nights per week. Do NOT use plastic applicator.

## 2023-07-06 NOTE — TELEPHONE ENCOUNTER
Patient wanted you to know that she checked her meds and she has the Oxybutynin CL ER 5mg tabs. She will NOT cut them in half. She does not need a refill because she only takes them PRN.

## 2023-07-06 NOTE — PROGRESS NOTES
HPI:        Patient is a 76 y.o. female in no acute distress. She is alert and oriented to person, place, and time. History   Referred for LUTS and bilateral flank pain. Treated with several courses of abx (not always positive culture) and Ditropan XL with improvement. Patient is and RN, works in [employer]. Microscopic hematuria: She did see Dr. Carl Locke in the past, normal cystoscopy. 7/2016 normal. CT and Cysto normal. Pelvic showed vaginal atrophy, no cystocele, no rectocele, no levator ani tenderness, no reproducible stress urinary incontinence     She saw Dr. Valentin Thorpe once in the past and was diagnosed with IC (at first appt, no cysto) but stopped the medication and did not continue follow-up with him. 6/2023 resumed vaginal estrogen    Today  Here today to follow-up for dysuria, frequency and urgency. Urine culture was negative. She did complete a course of Cipro. She does have a history of interstitial cystitis. She denies frequency, urgency, dysuria or incontinence. She did resume vaginal estrogen. She is having a daily bowel movement. She is taking oxybutynin every other day.     Past Medical History:   Diagnosis Date    Low back pain     Other intervertebral disc degeneration, lumbar region     Pure hypercholesterolemia     Unspecified vitamin D deficiency     Urgency of urination      Past Surgical History:   Procedure Laterality Date    KNEE SURGERY Left 1972    cartliage removal     [place] COLON CA SCRN NOT HI [address] IND N/A 6/22/2017    COLONOSCOPY performed by Kimberly Retana DO at [address]       Outpatient Encounter Medications as of 7/6/2023   Medication Sig Dispense Refill    albuterol sulfate (PROAIR RESPICLICK) 477 (90 Base) MCG/ACT aerosol powder inhalation Inhale 1 puff into the lungs every 6 hours as needed      estradiol (ESTRACE VAGINAL) 0.1 MG/GM vaginal cream Place 1 g vaginally daily Place a pea-sized amount vaginally daily x 2 wks, then use 3 times

## 2023-07-06 NOTE — PROGRESS NOTES
Random bladderscan performed in office today:  Pt last voided 15 mins ago, scan = 25 mL DISPLAY PLAN FREE TEXT

## 2024-06-20 ENCOUNTER — TELEPHONE (OUTPATIENT)
Dept: UROLOGY | Age: 69
End: 2024-06-20

## 2024-06-20 ENCOUNTER — HOSPITAL ENCOUNTER (OUTPATIENT)
Age: 69
Setting detail: SPECIMEN
Discharge: HOME | End: 2024-06-20
Payer: MEDICARE

## 2024-06-20 DIAGNOSIS — R30.0 DYSURIA: ICD-10-CM

## 2024-06-20 DIAGNOSIS — R30.0 DYSURIA: Primary | ICD-10-CM

## 2024-06-20 PROCEDURE — 87086 URINE CULTURE/COLONY COUNT: CPT

## 2024-06-20 NOTE — TELEPHONE ENCOUNTER
Please have her drop off urine culture to the lab    Make sure she is continue to use vaginal estrogen cream    Avoid caffeine and alcohol    Increase water intake to at least 80 ounces a day if she is not already doing this    Over-the-counter Azo for discomfort    If she develops any fever or chills she needs to contact our office versus going to the emergency room

## 2024-06-20 NOTE — TELEPHONE ENCOUNTER
Patient called this afternoon with the complaint of a possible UTI. She said she has frequency, burning, spasms, urgency, etc. Ms Ranker said she is the bathroom every 10 to 15 minutes with little to no results. She denies any fever, chills, nausea, etc.     She would like to know if she could come in today or tomorrow?? Or if she should drop off a urine sample.       Please advise

## 2024-06-21 LAB
MICROORGANISM SPEC CULT: NORMAL
SERVICE CMNT-IMP: NORMAL
SPECIMEN DESCRIPTION: NORMAL

## 2024-06-24 ENCOUNTER — TELEPHONE (OUTPATIENT)
Dept: UROLOGY | Age: 69
End: 2024-06-24

## 2024-06-24 NOTE — TELEPHONE ENCOUNTER
----- Message from Benjamin Shay PA-C sent at 6/24/2024  8:36 AM EDT -----  Please call pt - urine culture reviewed and does not show UTI    Keep upcoming appointment early next month   History and Physical        Subjective:     Judi is a 89yo female with past medical history significant for Alzheimer's dementia, moderate aortic valve stenosis, GERD, hx of DVT of left lower extremity (previously on Eliquis, not currently taking any medication) sent in by care team for evaluation of decreased p.o. intake, loose stools and altered mental status.    History obtained from chart review. Yesterday 3/30/21, Judi was noted to have loose stools.  Diandra (niece) notes that Judi also had decreased p.o. intake and appeared \"dehydrated \".  At [facility], Judi is oriented to herself only (A&Ox1), however, today she was noted to be aggitated and unable to communicate (A&Ox0) These concerns continued, and care team encouraged Diandra to take Judi to the emergency room for further evaluation and treatment.    Of note, Diandra states the caregivers recently tested positive for COVID.   Diandra has not been tested for COVID yet, but notes she has been fully vaccinated.     ER COURSE:  Vital notable for hypertension to 152/94  Labs obtained and significant for ESR 74, CRP 8.5, WBC 3.5, HGB 11.2, BNP 7022, LA 2.1, Trop 0.06, PCT 0.05, Ferritin 233. Blood cultures collected.   COVID POSITIVE  Give 500cc bolus NS and started on NS at 70cc/hr  CXR: New airspace disease through much of the right lung screening for pneumonia.  Mild airspace disease in the left lower lobe. No effusion identified.  No pneumothorax.  CT head: No acute intracranial process   CT PE: No PE. Atypical pneumonia. Mildly enlarged mediastinal and right hilar lymph nodes, likely reactive. Cardiomegaly and small pericardial effusion.  Severe coronary artery calcifications.  CT AP: Lots of stool in distal sigmoid and rectum. Circumferential thickening of the rectum with perirectal and presacral induration, possible stercoral colitis. Consider colonoscopy. Interval increased moderate intrahepatic and extra hepatic biliary dilatation with the CBD.      Past Medical History:   Diagnosis Date   • Acute deep vein thrombosis (DVT) of left lower extremity (CMS/HCC) 6/18/2018   • Alzheimer's disease (CMS/[facility])    • Aortic stenosis    • GERD (gastroesophageal reflux disease)    • Hemorrhoid       History reviewed. No pertinent surgical history.   (Not in a hospital admission)    ALLERGIES:  No Known Allergies   Social History     Tobacco Use   • Smoking status: Never Smoker   • Smokeless tobacco: Never Used   Substance Use Topics   • Alcohol use: Not on file      No family history on file.     Active Medications:  Current Facility-Administered Medications   Medication Dose Route Frequency Provider Last Rate Last Admin   • sodium chloride 0.9 % flush bag 25 mL  25 mL Intravenous PRN Will Little MD       • sodium chloride (PF) 0.9 % injection 2 mL  2 mL Intracatheter 2 times per day Will Little MD       • haloperidol (HALDOL) 5 MG/ML injection 0.5 mg  0.5 mg Intravenous PRN Will Little MD       • azithromycin (ZITHROMAX) 500 mg in sodium chloride 0.9 % 250 mL IVPB  500 mg Intravenous Once Will Little  mL/hr at 03/31/21 2120 500 mg at 03/31/21 2120   • sodium chloride 0.9% infusion   Intravenous Continuous Rea Fraire MD 70 mL/hr at 03/31/21 2147 New Bag at 03/31/21 2147     Current Outpatient Medications   Medication Sig Dispense Refill   • QUEtiapine (SEROQUEL) 25 MG tablet Take 1 tablet by mouth daily. 30 tablet 0   • polyethylene glycol (GLYCOLAX, MIRALAX) packet Take 17 g by mouth daily.         Review of Systems   Unable to perform ROS: Dementia        Objective:     Vitals:    03/31/21 2123   BP: (!) 177/62   Pulse: 68   Resp: 13   Temp:      No intake/output data recorded.  I/O this shift:  In: -   Out: 30 [Urine:30]    Physical Exam  Vitals reviewed.   Constitutional:       General: She is not in acute distress.     Appearance: She is not ill-appearing.      Comments: Sleeping on entrance to room  Arrousable when spoken  to, able to state name    HENT:      Head: Normocephalic and atraumatic.      Right Ear: External ear normal.      Left Ear: External ear normal.      Nose: Nose normal.      Mouth/Throat:      Mouth: Mucous membranes are moist.   Eyes:      Conjunctiva/sclera: Conjunctivae normal.   Cardiovascular:      Rate and Rhythm: Normal rate and regular rhythm.      Heart sounds: Murmur present.      Comments: Systolic murmur over aortic post   Pulmonary:      Effort: Pulmonary effort is normal.      Breath sounds: Normal breath sounds.   Abdominal:      General: Abdomen is flat. Bowel sounds are normal.      Palpations: Abdomen is soft.   Skin:     Capillary Refill: Capillary refill takes 2 to 3 seconds.   Neurological:      Comments: A&Ox1            Data Review:   Labs:   Recent Results (from the past 24 hour(s))   Lactic Acid Venous With Reflex    Collection Time: 03/31/21  6:51 PM   Result Value Ref Range    Lactate, Venous 2.1 (HH) 0.0 - 2.0 mmol/L   Comprehensive Metabolic Panel    Collection Time: 03/31/21  6:51 PM   Result Value Ref Range    Fasting Status      Sodium 136 135 - 145 mmol/L    Potassium 3.9 3.4 - 5.1 mmol/L    Chloride 102 98 - 107 mmol/L    Carbon Dioxide 25 21 - 32 mmol/L    Anion Gap 13 10 - 20 mmol/L    Glucose 90 65 - 99 mg/dL    BUN 32 (H) 6 - 20 mg/dL    Creatinine 0.85 0.51 - 0.95 mg/dL    Glomerular Filtration Rate 56 (L) >90 mL/min/1.73m2    BUN/ Creatinine Ratio 38 (H) 7 - 25    Calcium 8.3 (L) 8.4 - 10.2 mg/dL    Bilirubin, Total 0.4 0.2 - 1.0 mg/dL    GOT/AST 28 <=37 Units/L    GPT/ALT 15 <64 Units/L    Alkaline Phosphatase 67 45 - 117 Units/L    Albumin 3.1 (L) 3.6 - 5.1 g/dL    Protein, Total 7.3 6.4 - 8.2 g/dL    Globulin 4.2 (H) 2.0 - 4.0 g/dL    A/G Ratio 0.7 (L) 1.0 - 2.4   Prothrombin Time    Collection Time: 03/31/21  6:51 PM   Result Value Ref Range    Prothrombin Time 10.5 9.7 - 11.8 sec    INR 1.0 <=5.0   CBC with Automated Differential (performable only)    Collection Time:  03/31/21  6:51 PM   Result Value Ref Range    WBC 3.5 (L) 4.2 - 11.0 K/mcL    RBC 3.68 (L) 4.00 - 5.20 mil/mcL    HGB 11.2 (L) 12.0 - 15.5 g/dL    HCT 35.3 (L) 36.0 - 46.5 %    MCV 95.9 78.0 - 100.0 fl    MCH 30.4 26.0 - 34.0 pg    MCHC 31.7 (L) 32.0 - 36.5 g/dL    RDW-CV 16.6 (H) 11.0 - 15.0 %    RDW-SD 58.1 (H) 39.0 - 50.0 fL     140 - 450 K/mcL    NRBC 0 <=0 /100 WBC   Troponin I Ultra Sensitive    Collection Time: 03/31/21  6:51 PM   Result Value Ref Range    Troponin I, Ultra Sensitive 0.06 (HH) <=0.04 ng/mL   NT proBNP    Collection Time: 03/31/21  6:51 PM   Result Value Ref Range    NT-proBNP 7,022 (H) <=450 pg/mL   Magnesium    Collection Time: 03/31/21  6:51 PM   Result Value Ref Range    Magnesium 2.0 1.7 - 2.4 mg/dL   Sedimentation Rate Westergren    Collection Time: 03/31/21  6:51 PM   Result Value Ref Range    RBC Sedimentation Rate 74 (H) 0 - 20 mm/hr   Procalcitonin    Collection Time: 03/31/21  6:51 PM   Result Value Ref Range    Procalcitonin 0.05 <=0.09 ng/mL   C Reactive Protein    Collection Time: 03/31/21  6:51 PM   Result Value Ref Range    C-Reactive Protein 8.5 (H) <=1.0 mg/dL   Rapid SARS-CoV-2 by PCR    Collection Time: 03/31/21  6:51 PM    Specimen: Nasopharyngeal; Swab   Result Value Ref Range    Rapid SARS-COV-2 by PCR Detected (A) Not Detected / Detected / Presumptive Positive / Inhibitors present    Procedural Comment      SARS-CoV-2 Ct Value 18.6    Lipase    Collection Time: 03/31/21  6:51 PM   Result Value Ref Range    Lipase 99 73 - 393 Units/L   Creatine Kinase    Collection Time: 03/31/21  6:51 PM   Result Value Ref Range     26 - 192 Units/L   D Dimer, Quantitative    Collection Time: 03/31/21  6:51 PM   Result Value Ref Range    D Dimer, Quantitative 0.59 (H) <0.57 mg/L (FEU)   Manual Differential    Collection Time: 03/31/21  6:51 PM   Result Value Ref Range    Neutrophil, Percent 58 %    Lymphocytes, Percent 27 %    Mono, Percent 7 %    Eosinophils, Percent 1 %     Basophils, Percent 0 %    Bands, Percent 5 0 - 10 %    Reactive Lymphocytes, Percent 2 0 - 5 %    Absolute Neutrophil 2.2 1.8 - 7.7 K/mcL    Absolute Lymphocytes 1.0 1.0 - 4.0 K/mcL    Absolute Monocytes 0.2 (L) 0.3 - 0.9 K/mcL    Absolute Eosinophils 0.0 0.0 - 0.5 K/mcL    Absolute Basophils 0.0 0.0 - 0.3 K/mcL   Ferritin    Collection Time: 03/31/21  6:57 PM   Result Value Ref Range    Ferritin 233 8 - 252 ng/mL   Urinalysis & Reflex Microscopy With Culture If Indicated    Collection Time: 03/31/21  9:31 PM   Result Value Ref Range    COLOR, URINALYSIS Yellow     APPEARANCE, URINALYSIS Hazy     GLUCOSE, URINALYSIS Negative Negative mg/dL    BILIRUBIN, URINALYSIS Negative Negative    KETONES, URINALYSIS Negative Negative mg/dL    SPECIFIC GRAVITY, URINALYSIS >1.030 (H) 1.005 - 1.030    OCCULT BLOOD, URINALYSIS Small (A) Negative    PH, URINALYSIS 5.0 5.0 - 7.0    PROTEIN, URINALYSIS 30  (A) Negative mg/dL    UROBILINOGEN, URINALYSIS 2.0 (A) 0.2, 1.0 mg/dL    NITRITE, URINALYSIS Positive (A) Negative    LEUKOCYTE ESTERASE, URINALYSIS Trace (A) Negative    SQUAMOUS EPITHELIAL, URINALYSIS 1 to 5 None Seen, 1 to 5 /hpf    ERYTHROCYTES, URINALYSIS 6 to 10 (A) None Seen, 1 to 2 /hpf    LEUKOCYTES, URINALYSIS 11 to 25 (A) None Seen, 1 to 5 /hpf    BACTERIA, URINALYSIS Few (A) None Seen /hpf    HYALINE CASTS, URINALYSIS None Seen None Seen, 1 to 5 /lpf    MUCUS Present        Imaging:   CT HEAD WO CONTRAST   Final Result      No evidence of acute intracranial process.   Paranasal sinus disease.       Noncontrast enhanced CT is a screening survey. Conditions such as vascular   malformations,     aneurysms, low grade, tumors, meningitis, subtle subarachnoid hemorrhages,   etc., may not be     demonstrated. Followup studies as clinically indicated may be necessary.       HAVEN JEFFRIES M.D., have reviewed the images and report and concur with   these findings interpreted by OZ MADDEN MD.      Electronically Signed by:  HAVEN STERLING M.D.    Signed on: 3/31/2021 8:29 PM          CTA CHEST PULMONARY EMBOLISM W CONTRAST   Final Result   1.    No large central or proximal segmental pulmonary emboli.    Nondiagnostic evaluation of the distal segmental and subsegmental pulmonary   arteries due to motion artifact.   2.    Diffuse bilateral groundglass airspace disease, suggestive of   atypical pneumonia with Covid 19 as consideration.  Mildly enlarged   mediastinal and right hilar lymph nodes, likely reactive.  Recommend repeat   imaging after treatment to assess resolution.   3.   Cardiomegaly and small pericardial effusion.  Severe coronary artery   calcifications.   4.   Mild dilation of the pulmonary trunk, increased since 2015.  May   represent pulmonary hypertension.      Dictated by: OZ MADDEN MD   Dictated on: 3/31/2021 8:29 PM       I, HAVEN STERLING M.D., have reviewed the images and report and concur with   these findings interpreted by OZ MADDEN MD.      Electronically Signed by: HAVEN STERLING M.D.    Signed on: 3/31/2021 8:50 PM          CT ABDOMEN PELVIS W CONTRAST - IV contrast only   Final Result      Very large colonic stool burden marked stool-filled distention of the   distal sigmoid and rectum.  There is circumferential thickening of the   rectum with perirectal and presacral induration.  Stercoral colitis must be   suspected.  Colonoscopy should be considered when medically appropriate.      Interval increased moderate intrahepatic and extra hepatic biliary   dilatation with the common bile duct measuring 1.1 cm.  Correlation with   laboratory values and further evaluation with MRCP should be considered to   assess for distal obstructing process.      Mild prominence of the downstream pancreatic duct, similar to prior exam.      Mild thickening urinary bladder may be due to incomplete distention with   cystitis not entirely excluded, correlate clinically.      Mild gastric thickening which could be due to  incomplete distention with   gastritis not entirely excluded.         Please see separately dictated report for CT chest from same day for   additional details.      Electronically Signed by: MATA ROSE M.D.    Signed on: 3/31/2021 8:45 PM          XR CHEST PA OR AP 1 VIEW   Final Result   FINDINGS AND IMPRESSION:      New airspace disease through much of the right lung screening for   pneumonia.  Mild airspace disease in the left lower lobe.      No effusion identified.  No pneumothorax.      Cardiac size is within normal limits.  Atherosclerotic change of the aortic   arch noted.      No acute osseous abnormality.        Electronically Signed by: HAVEN STERLING M.D.    Signed on: 3/31/2021 7:19 PM               Assessment and Plan:     Judi is a 89yo female with past medical history significant for Alzheimer's dementia, moderate aortic valve stenosis, GERD, DVT of left lower extremity (previously on Eliquis, not currently taking any medication) sent in by care team for evaluation of decreased p.o. intake, loose stools and altered mental status.    Principal Problem:    COVID-19 virus infection  Active Problems:    Alzheimer disease (CMS/HCC)    Aortic valve stenosis, moderate    Pneumonia due to infectious organism    Elevated troponin    Elevated lactic acid level    Loose stools      #AMS likely 2/2 COVID pneumonia  - CXR: New airspace disease through much of the right lung screening for pneumonia.  Mild airspace disease in the left lower lobe. No effusion identified.  No pneumothorax.  - WBC 3.5, LA 2.1, ESR 74, CRP 8.5 on admission   - Received ceftriaxone 2g and azithromycin 500mg in ER  - Continue treatment with ceftriaxone 1g Q24hrs and azithromycin 500mg during inpatient stay   - Dexamethasone 6mg daily   - Repeat LA pending   - Blood cultures pending, will follow up results     #Troponemia  - Trop 0.06 on admission   - Likely secondary to demand and dehydration  - Repeat Troponin pending     #Loose  Stools  - C diff PCR ordered in ER  - Follow up results     #Alzheimer Dementia   - A&Ox1 (only to self, not oriented to year and place) at baseline. Currently A&Ox0  - Palliative care consult for discussion on goals of care, given advance nature of AD    #Moderate Aortic Valve Stenosis  - Asymptomatic  - No interventions at this time, continue to monitor    #GERD  - Consider treatment if symptomatic    #History DVT of LLE  - Previously on eliquis years ago. No longer taking an anticoagulation     Diet:  general diet   Fluids: NS 70 cc/hr    VTE/GI Prophylaxis:  Lovenox    Code Status: Full Code   Emergency Contact: SUNIL CORRAL (Niece) [phone]    Dispo: Pending improvement in AMS status     Discussed with supervising physician     Lo Sprague DO  3/31/2021 10:08 PM

## 2024-07-09 ENCOUNTER — OFFICE VISIT (OUTPATIENT)
Dept: UROLOGY | Age: 69
End: 2024-07-09
Payer: MEDICARE

## 2024-07-09 VITALS
WEIGHT: 119.2 LBS | TEMPERATURE: 97.5 F | HEART RATE: 83 BPM | BODY MASS INDEX: 19.84 KG/M2 | DIASTOLIC BLOOD PRESSURE: 69 MMHG | SYSTOLIC BLOOD PRESSURE: 116 MMHG

## 2024-07-09 DIAGNOSIS — N39.0 FREQUENT UTI: ICD-10-CM

## 2024-07-09 DIAGNOSIS — N95.8 GENITOURINARY SYNDROME OF MENOPAUSE: Primary | ICD-10-CM

## 2024-07-09 DIAGNOSIS — N39.41 URGE INCONTINENCE OF URINE: ICD-10-CM

## 2024-07-09 PROCEDURE — 1123F ACP DISCUSS/DSCN MKR DOCD: CPT | Performed by: NURSE PRACTITIONER

## 2024-07-09 PROCEDURE — G8420 CALC BMI NORM PARAMETERS: HCPCS | Performed by: NURSE PRACTITIONER

## 2024-07-09 PROCEDURE — 0509F URINE INCON PLAN DOCD: CPT | Performed by: NURSE PRACTITIONER

## 2024-07-09 PROCEDURE — PBSHW PR MEAS POST-VOIDING RESIDUAL URINE&/BLADDER CAP: Performed by: NURSE PRACTITIONER

## 2024-07-09 PROCEDURE — 99214 OFFICE O/P EST MOD 30 MIN: CPT | Performed by: NURSE PRACTITIONER

## 2024-07-09 PROCEDURE — 1090F PRES/ABSN URINE INCON ASSESS: CPT | Performed by: NURSE PRACTITIONER

## 2024-07-09 PROCEDURE — 51798 US URINE CAPACITY MEASURE: CPT | Performed by: NURSE PRACTITIONER

## 2024-07-09 PROCEDURE — G8427 DOCREV CUR MEDS BY ELIG CLIN: HCPCS | Performed by: NURSE PRACTITIONER

## 2024-07-09 PROCEDURE — G8399 PT W/DXA RESULTS DOCUMENT: HCPCS | Performed by: NURSE PRACTITIONER

## 2024-07-09 PROCEDURE — 1036F TOBACCO NON-USER: CPT | Performed by: NURSE PRACTITIONER

## 2024-07-09 PROCEDURE — 3017F COLORECTAL CA SCREEN DOC REV: CPT | Performed by: NURSE PRACTITIONER

## 2024-07-09 RX ORDER — ESTRADIOL 0.1 MG/G
CREAM VAGINAL
Qty: 42.5 G | Refills: 3 | Status: SHIPPED | OUTPATIENT
Start: 2024-07-09

## 2024-07-09 NOTE — PATIENT INSTRUCTIONS
Estradiol: Insert one inch of cream inside the vagina and place an additional pea-sized amount to the urethra and inner labia every night for 4 weeks, then three nights per week thereafter. Do NOT use plastic applicator.         SURVEY:    You may be receiving a survey from [employer] regarding your visit today.    Please complete the survey to enable us to provide the highest quality of care to you and your family.    If you cannot score us a very good on any question, please call the office to discuss how we could have made your experience a very good one.    Thank you.

## 2024-07-09 NOTE — PROGRESS NOTES
History   Referred for LUTS and bilateral flank pain. Treated with several courses of abx (not always positive culture) and Ditropan XL with improvement.     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      Microscopic hematuria: She did see Dr. Hopson in the past, normal cystoscopy.   7/2016 normal. CT and Cysto normal. Pelvic showed vaginal atrophy, no cystocele, no rectocele, no levator ani tenderness, no reproducible stress urinary incontinence     She saw Dr. Ramirez once in the past and was diagnosed with IC (at first appt, no cysto) but stopped the medication and did not continue follow-up with him.    6/2023 resumed vaginal estrogen    Today  Here today to follow-up for history of hematuria and GSM. She is not using the estrogen cream inside the vagina.  She denies any frequency or nocturia currently, but she did call on 6/20/2024 with complaints of frequency, urgency and dysuria.  Urine culture was negative for infection.  She does have a history of interstitial cystitis.  She did resume oxybutynin due to worsening urinary symptoms.  She is having severe dry mouth with the oxybutynin.     Plan  Remain off of oxybutynin    Educated on proper use of estradiol cream: Insert one inch of cream inside the vagina and place an additional pea-sized amount to the urethra and inner labia every night for 4 weeks, then three nights per week thereafter. Do NOT use plastic applicator.    F/U in 4 months to reasses urinary symptoms and for pelvic

## 2024-09-23 ENCOUNTER — HOSPITAL ENCOUNTER (OUTPATIENT)
Dept: WOMENS IMAGING | Age: 69
Discharge: HOME | End: 2024-09-25
Payer: MEDICARE

## 2024-09-23 DIAGNOSIS — Z12.31 VISIT FOR SCREENING MAMMOGRAM: ICD-10-CM

## 2024-09-23 PROCEDURE — 77063 BREAST TOMOSYNTHESIS BI: CPT

## 2024-11-12 ENCOUNTER — OFFICE VISIT (OUTPATIENT)
Dept: UROLOGY | Age: 69
End: 2024-11-12
Payer: MEDICARE

## 2024-11-12 VITALS
WEIGHT: 120 LBS | SYSTOLIC BLOOD PRESSURE: 102 MMHG | TEMPERATURE: 97.8 F | BODY MASS INDEX: 19.99 KG/M2 | HEIGHT: 65 IN | DIASTOLIC BLOOD PRESSURE: 60 MMHG

## 2024-11-12 DIAGNOSIS — N39.0 FREQUENT UTI: ICD-10-CM

## 2024-11-12 DIAGNOSIS — N39.41 URGE INCONTINENCE OF URINE: ICD-10-CM

## 2024-11-12 DIAGNOSIS — N95.8 GENITOURINARY SYNDROME OF MENOPAUSE: Primary | ICD-10-CM

## 2024-11-12 DIAGNOSIS — N39.0 FREQUENT UTI: Primary | ICD-10-CM

## 2024-11-12 PROCEDURE — 1123F ACP DISCUSS/DSCN MKR DOCD: CPT | Performed by: NURSE PRACTITIONER

## 2024-11-12 PROCEDURE — 1159F MED LIST DOCD IN RCRD: CPT | Performed by: NURSE PRACTITIONER

## 2024-11-12 PROCEDURE — PBSHW MEAS,POST-VOID RES,US,NON-IMAGING: Performed by: NURSE PRACTITIONER

## 2024-11-12 PROCEDURE — G8399 PT W/DXA RESULTS DOCUMENT: HCPCS | Performed by: NURSE PRACTITIONER

## 2024-11-12 PROCEDURE — G8420 CALC BMI NORM PARAMETERS: HCPCS | Performed by: NURSE PRACTITIONER

## 2024-11-12 PROCEDURE — G8427 DOCREV CUR MEDS BY ELIG CLIN: HCPCS | Performed by: NURSE PRACTITIONER

## 2024-11-12 PROCEDURE — 1036F TOBACCO NON-USER: CPT | Performed by: NURSE PRACTITIONER

## 2024-11-12 PROCEDURE — 0509F URINE INCON PLAN DOCD: CPT | Performed by: NURSE PRACTITIONER

## 2024-11-12 PROCEDURE — G8484 FLU IMMUNIZE NO ADMIN: HCPCS | Performed by: NURSE PRACTITIONER

## 2024-11-12 PROCEDURE — 51798 US URINE CAPACITY MEASURE: CPT | Performed by: NURSE PRACTITIONER

## 2024-11-12 PROCEDURE — 99213 OFFICE O/P EST LOW 20 MIN: CPT | Performed by: NURSE PRACTITIONER

## 2024-11-12 PROCEDURE — 1090F PRES/ABSN URINE INCON ASSESS: CPT | Performed by: NURSE PRACTITIONER

## 2024-11-12 PROCEDURE — 3017F COLORECTAL CA SCREEN DOC REV: CPT | Performed by: NURSE PRACTITIONER

## 2024-11-12 RX ORDER — INHALER,ASSIST DEV,SMALL MASK
SPACER (EA) MISCELLANEOUS
COMMUNITY
Start: 2024-11-03

## 2024-11-12 NOTE — PROGRESS NOTES
History   Referred for LUTS and bilateral flank pain. Treated with several courses of abx (not always positive culture) and Ditropan XL with improvement.     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      Microscopic hematuria: She did see Dr. Hopson in the past, normal cystoscopy.   7/2016 normal. CT and Cysto normal. Pelvic showed vaginal atrophy, no cystocele, no rectocele, no levator ani tenderness, no reproducible stress urinary incontinence     She saw Dr. Ramirez once in the past and was diagnosed with IC (at first appt, no cysto) but stopped the medication and did not continue follow-up with him.    6/2023 resumed vaginal estrogen    7/2024 She is not using the estrogen cream inside the vagina.  She denies any frequency or nocturia currently, but she did call on 6/20/2024 with complaints of frequency, urgency and dysuria.  Urine culture was negative for infection.  She does have a history of interstitial cystitis.  She did resume oxybutynin due to worsening urinary symptoms.  She is having severe dry mouth with the oxybutynin.    Stopped oxybutynin     Educated on proper use of estradiol        Today  Here today to follow-up for  GSM.  She denies any dysuria, gross hematuria, frequency, urgency or incontinence.  She is using estradiol every other night.  PVR is low.    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  No urethral caruncle.  Mild erythema to the introitus.    Plan  Continue estradiol 3 nights per week    Call our office immediately for any new or worsening symptoms    Follow-up in 1 year or sooner if needed

## (undated) DEVICE — MEDI-VAC NON-CONDUCTIVE TUBING7MM X 30.5 (100FT): Brand: CARDINAL HEALTH

## (undated) DEVICE — CANNULA ORAL NSL AD CO2 N INTUB O2 DEL DISP TRU LNK

## (undated) DEVICE — SOLUTION IV IRRIG POUR BRL 0.9% SODIUM CHL 2F7124